# Patient Record
Sex: MALE | Race: WHITE | NOT HISPANIC OR LATINO | Employment: OTHER
[De-identification: names, ages, dates, MRNs, and addresses within clinical notes are randomized per-mention and may not be internally consistent; named-entity substitution may affect disease eponyms.]

---

## 2023-07-18 ENCOUNTER — TRANSCRIBE ORDERS (OUTPATIENT)
Dept: OTHER | Age: 88
End: 2023-07-18

## 2023-07-18 DIAGNOSIS — D50.9 IRON DEFICIENCY ANEMIA, UNSPECIFIED: Primary | ICD-10-CM

## 2023-07-19 NOTE — TELEPHONE ENCOUNTER
REFERRAL INFORMATION:  Referring Provider:  Faye Vann  Referring Clinic:  [facility]  Reason for Visit/Diagnosis: Iron deficiency anemia, unspecified     FUTURE VISIT INFORMATION:  Appointment Date: 8/4/2023  Appointment Time:      NOTES STATUS DETAILS   OFFICE NOTE from Referring Provider N/A    OFFICE NOTE from Other Specialist N/A 6/26/2023 URO visit with AMARIS Panda   HOSPITAL DISCHARGE SUMMARY/  ED VISITS N/A    OPERATIVE REPORT N/A    MEDICATION LIST N/A         ENDOSCOPY  N/A    COLONOSCOPY N/A 1/17/2014 [facility]    ERCP N/A    EUS N/A    STOOL TESTING N/A    PERTINENT LABS N/A    PATHOLOGY REPORTS (RELATED) N/A    IMAGING (CT, MRI, EGD, MRCP, Small Bowel Follow Through/SBT, MR/CT Enterography) N/A      Request sent to [facility] for Records fax [phone]  Records received and send to Amaris FONSECA

## 2023-08-04 ENCOUNTER — OFFICE VISIT (OUTPATIENT)
Dept: GASTROENTEROLOGY | Facility: CLINIC | Age: 88
End: 2023-08-04
Payer: COMMERCIAL

## 2023-08-04 ENCOUNTER — PRE VISIT (OUTPATIENT)
Dept: GASTROENTEROLOGY | Facility: CLINIC | Age: 88
End: 2023-08-04

## 2023-08-04 VITALS — TEMPERATURE: 97.6 F | DIASTOLIC BLOOD PRESSURE: 80 MMHG | SYSTOLIC BLOOD PRESSURE: 138 MMHG | WEIGHT: 157.38 LBS

## 2023-08-04 DIAGNOSIS — D50.0 IRON DEFICIENCY ANEMIA DUE TO CHRONIC BLOOD LOSS: Primary | ICD-10-CM

## 2023-08-04 DIAGNOSIS — R13.19 ESOPHAGEAL DYSPHAGIA: ICD-10-CM

## 2023-08-04 PROCEDURE — 99204 OFFICE O/P NEW MOD 45 MIN: CPT | Performed by: NURSE PRACTITIONER

## 2023-08-04 RX ORDER — AMLODIPINE BESYLATE 2.5 MG/1
2.5 TABLET ORAL
COMMUNITY
Start: 2023-01-09

## 2023-08-04 RX ORDER — OXYBUTYNIN CHLORIDE 5 MG/1
40 TABLET, EXTENDED RELEASE ORAL DAILY
COMMUNITY

## 2023-08-04 RX ORDER — ALLOPURINOL 100 MG/1
200 TABLET ORAL
COMMUNITY

## 2023-08-04 RX ORDER — ACETAMINOPHEN 500 MG
1000 TABLET ORAL
COMMUNITY

## 2023-08-04 RX ORDER — LOSARTAN POTASSIUM 50 MG/1
50 TABLET ORAL EVERY MORNING
COMMUNITY

## 2023-08-04 RX ORDER — IBUPROFEN 400 MG/1
400 TABLET, FILM COATED ORAL
COMMUNITY

## 2023-08-04 RX ORDER — OFLOXACIN 3 MG/ML
SOLUTION/ DROPS OPHTHALMIC
COMMUNITY
Start: 2022-12-28

## 2023-08-04 RX ORDER — PREDNISOLONE ACETATE 10 MG/ML
SUSPENSION/ DROPS OPHTHALMIC
COMMUNITY
Start: 2022-12-28

## 2023-08-04 ASSESSMENT — PAIN SCALES - GENERAL: PAINLEVEL: NO PAIN (0)

## 2023-08-04 NOTE — PROGRESS NOTES
Gastroenterology CLINIC VISIT, NEW PATIENT    CC/REFERRING PROVIDER: Faye Vann  REASON FOR CONSULTATION: iron deficiency anemia    HPI: 88 year old male was referred  to GI clinic for consult on iron deficiency anemia from questionable chronic GI blood loss. Last documented hemoglobin was 13.5. [facility], but the patient stated that he was evaluated for possible malignancy due to changes in his blood tests. Noted that he underwent PET CT which was negative for malignancy. There was an accidental findings of severe hydronephrosis from obstructive renal calculi, which was addressed by urology.   Patient is here today with his wife. Stated that overall, he is feeling well. Has good appetite. No recent weight loss, but  said that he is down from 180s 2 years ago to 150s. Denies abdominal pain, nausea, and vomiting. Admits to chronic problem with swallowing for 2-3 years (some dysphagia, but no chocking). No black stools.    PREVIOUS ENDOSCOPY:  2014 Colonoscopy:  Sigmoid diverticulosis.  No further screening recommended due to patient's age.    PERTINENT STUDIES Reviewed in EMR  5/24/23 PET CT  FINDINGS:   NECK: There is no abnormal metabolic activity within the neck.  There are no enlarged   cervical lymph nodes.   CHEST:   There is no abnormal metabolic activity within the chest.  There are no   enlarged supraclavicular, axillary or mediastinal lymph nodes.   There are calcified pulmonary nodules and calcified right hilar lymph nodes   which are consistent with old granulomatous disease.  There is atherosclerotic   calcification of the left anterior descending coronary artery.   ABDOMEN/PELVIS:   There is mild metabolic uptake within the ascending colon and stomach, presumed   to be physiologic.  No obvious asymmetric wall thickening is seen within the   stomach and bowel on this noncontrast CT.   There has been interval development of severe right  hydronephrosis and   hydroureter secondary to 1 x 0.6 x 1.4 cm stone within the distal right ureter   approximately 2 cm proximal to the ureterovesicular junction.   Is another nonobstructing punctate stone within the right kidney and 2 mm   nonobstructing stone within the left kidney.  There is a stable peripherally   calcified lesion, presumable cyst along the inferior pole of left kidney   medially.  There are others stable renal cysts.   There is cholelithiasis with a 3 cm calcified gallstone.  There is sigmoid and   descending colon diverticulosis without findings of acute diverticulitis.   There is mild atherosclerotic calcification of the abdominal aorta which is   nonaneurysmal.  There is a small fat containing left inguinal hernia.   MUSCULOSKELETAL:  There is no abnormal metabolic activity within the osseous structures.  There   are no suspicious osteolytic or osteoblastic lesions.   The left iliac bone is expanded with trabecular thickening consistent with   underlying Paget's disease.  There are multilevel degenerative changes of the   spine.  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     IMPRESSION:   1. No definite PET/CT findings to suggest primary or metastatic disease within   the neck, chest, abdomen or pelvis.   2. There is a 1 x 0.6 x 1.4 cm obstructing stone within the distal right ureter   approximately 2 cm proximal to the ureterovesicular junction causing severe   right hydronephrosis and hydroureter.  Urology consultation is recommended.   3. Sigmoid and descending colon diverticulosis.   4. Cholelithiasis.   5. Old granulomatous disease.   6. Mild atherosclerotic calcification including calcification of the left   anterior descending coronary artery.   7. 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   8. Other nonacute findings as described.       ROS: 10pt ROS performed and otherwise negative.    PAST MEDICAL HISTORY:  Basal carcinoma of  skin  Bladder outlet obstruction  Cataract    PREVIOUS ABDOMINAL/GYNECOLOGIC SURGERIES:  Abdominal wall hernia    Past Surgical History:   Procedure Laterality Date    IR CYSTOGRAM  2015         PERTINENT MEDICATIONS:  Current Outpatient Medications   Medication Sig Dispense Refill    acetaminophen (TYLENOL) 500 MG tablet Take 1,000 mg by mouth      allopurinol (ZYLOPRIM) 100 MG tablet Take 200 mg by mouth      amLODIPine (NORVASC) 2.5 MG tablet Take 2.5 mg by mouth      ibuprofen (ADVIL/MOTRIN) 400 MG tablet Take 400 mg by mouth      losartan (COZAAR) 50 MG tablet Take 50 mg by mouth every morning      ofloxacin (OCUFLOX) 0.3 % ophthalmic solution Instill 1 drop into right eye TID 2 days before surgery. AFTER surgery TID x 1 week, then stop.      oxybutynin ER (DITROPAN XL) 5 MG 24 hr tablet Take 40 mg by mouth daily      prednisoLONE acetate (PRED FORTE) 1 % ophthalmic suspension AFTER surgery instill 1 drop into right eye TID x 1 week, BID x 1 week, and then DAILY x 1 week.      Sodium Hyaluronate, oral, (HYALURONIC ACID PO) Take 100 mg by mouth         No other OTC/herbal/supplements reported by patient.    SOCIAL HISTORY:  Social History     Socioeconomic History    Marital status:      Spouse name: Not on file    Number of children: Not on file    Years of education: Not on file    Highest education level: Not on file   Occupational History    Not on file   Tobacco Use    Smoking status: Former     Types: Cigarettes     Quit date: 1965     Years since quittin.6    Smokeless tobacco: Never   Substance and Sexual Activity    Alcohol use: Yes    Drug use: Not on file    Sexual activity: Not on file   Other Topics Concern    Not on file   Social History Narrative    Not on file     Social Determinants of Health     Financial Resource Strain: Not on file   Food Insecurity: Not on file   Transportation Needs: Not on file   Physical Activity: Not on file   Stress: Not on file   Social Connections: Not  on file   Intimate Partner Violence: Not on file   Housing Stability: Not on file       FAMILY HISTORY:  Denies panc/esophageal/other GI CA, no other Quiroga or other HPS-related Natasha. No IBD/celiac, no other AI/liver/thyroid disease.  Colon cancer in mother ( at age 66)      PHYSICAL EXAMINATION:  Vitals reviewed  /80 (BP Location: Right arm, Patient Position: Sitting, Cuff Size: Adult Regular)   Temp 97.6  F (36.4  C) (Temporal)   Wt 71.4 kg (157 lb 6 oz)     General: Patient appears well in no acute distress.   Skin: No visualized rash or lesions on visualized skin  Eyes: EOMI, no erythema, sclera icterus or discharge noted  Resp: breathing comfortably without accessory muscle usage, speaking in full sentences, no cough. Lung sounds clear  Card: Regular and rhythmic S1 and S2. No gallop or rub. No murmur. No LE edema.  Abdomen: Active bowel sounds X 4 quadrants. Soft to palpation.  No guarding or rebound tenderness   MSK: Appears to have normal range of motion based on visualized movements  Neurologic: No apparent tremors, facial movements symmetric  Psych: affect normal, alert and oriented      ASSESSMENT/PLAN:  88 year old male  presented  to GI clinic for evaluation of possible chronic GI bleeding as a cause of iron deficiency anemia. [facility] by [facility]ology/hematology. Unfortunately, there was no supportive health records were shared by the [facility] prior to the visit. Will ask our care  to obtain.  Noted that PET CT was negative for mets or malignancy.  Patient appear to be in no acute distress. Last Hgb was 13.5. Noted thrombocytosis and leukocytosis.   Will proceed with a priority upper and lower GI endoscopy.  Patient stated that he has a follow up appointment with his PCP and lab work in 4 days. I suggested to have B12, folate, and peripheral blood smear added , if not done earlier.  Educated on prevention of aspiration and chocking. Emphasized importance of  proper hydration and nutrition.  Patient's and his wife's questions were answered to their satisfaction.      ICD-10-CM    1. Iron deficiency anemia due to chronic blood loss  D50.0 Adult GI  Referral - Consult Only     Adult GI  Referral - Procedure Only      2. Esophageal dysphagia  R13.19 Adult GI  Referral - Procedure Only           Patient and his wife verbalized understanding and appreciation of care provided. Stated that all of the questions were answered to her/his satisfaction.    RTC in one month    Thank you for this consultation. It was a pleasure to participate in the care of this patient; please contact us with any further questions.    ONEYDA Woo, FNP-C  [facility]  Gastroenterology Department  [place], [place]    This note was created with Dragon voice recognition software, and while reviewed for accuracy, inadvertent minor typographic errors may occur. Please contact the provider if you have any questions.

## 2023-08-04 NOTE — PATIENT INSTRUCTIONS
"It was a pleasure taking care of you today.  I've included a brief summary of our discussion and care plan from today's visit below.  Please review this information with your primary care provider.  ______________________________________________________________________    My recommendations are summarized as follows:    As we discussed today, I ordered upper and lower endoscopy to identify possible source of blood loss. They will call you to schedule an appointment. Please plan to have a reliable  that day.    2. Maintain proper hydration and nutrition. Eat small portion meals frequently.    3. Keep an appointment with your primary care provider and have your blood tests done to monitor you for anemia. I would suggest to add vitamin B12 and folate along with peripheral blood smear to the tests.    Return to GI Clinic in one month to review your progress.    ______________________________________________________________________    What is a GI bleed?  \"GI\" stands for \"gastrointestinal.\" The GI system, or GI tract, includes all of the organs in the body that process food . This includes the:  ?Esophagus (the tube that connects the mouth to the stomach)  ?Stomach  ?Small intestine (small bowel)  ?Large intestine (colon or large bowel)  A GI bleed is when any of these organs start to bleed. Often, you do not know that you are bleeding, because it's happening inside your body. But sometimes, there are signs that it is happening.  There are 2 common types of GI bleeds:  ?\"Upper GI bleeds\" - These affect the esophagus, the stomach, and the first part of the small intestine.  ?\"Lower GI bleeds\" - These affect the large intestine (colon).  Bleeding can also happen in the middle of the small intestine, but this is much less common. This is sometimes called \"mid-GI bleeding.\"  What are the symptoms of a GI bleed?  The symptoms depend on whether you have an upper or lower GI bleed. Some people have no symptoms. They find " "out that they have bleeding when a doctor or nurse does a rectal exam on them or a blood test shows that they have something called \"anemia.\" (Anemia is when a person has too few red blood cells.)  The symptoms of an upper GI bleed can include:  ?Vomiting blood or something that looks like coffee grounds  ?Diarrhea or bowel movements that look like black tar - This can happen with lower GI bleeds, too, but it is less common.  The symptoms of a lower GI bleed can include:  ?Bowel movements that look bloody - This can happen with upper GI bleeds, too, but it is less common.  Symptoms that can happen with either an upper or lower GI bleed include:  ?Feeling weak, lightheaded, or woozy (especially if you lose a lot of blood)  ?Racing heartbeat (if you lose a lot of blood)  ?Cramps or belly pain  ?Diarrhea  ?Pale skin  Should I see a doctor or nurse?  See your doctor or nurse right away if you:  ?Vomit blood or something that looks like coffee grounds  ?Have a bowel movement that looks like tar or has blood in it  ?Feel weak, lightheaded, or woozy  ?Have a racing heartbeat  ?Have severe belly pain  ?Turn much paler than normal  What can cause a GI bleed?  The most common causes of GI bleeds include:  ?Ulcers in the stomach or small intestine - Ulcers are sores on the lining of the GI tract.  ?Problems with the blood vessels, for example:  Swollen veins in the esophagus called \"varices\"  Abnormal blood vessels called \"arteriovenous malformations\" (\"AVMs\")  Fragile, swollen blood vessels called \"gastric antral vascular ectasia\" (\"GAVE\")  ?Diverticulosis - This is a condition in which tiny pouches form in the lining of the intestine.  ?Crohn disease or ulcerative colitis - These are conditions that can cause sores to form in the lining of the gut.  ?Hemorrhoids - These are swollen veins in the rectum (the lower part of the colon).  ?Anal fissures - These are tears around the anus.  ?Polyps - These are small growths that can " "form inside the colon.  ?Cancer (this is rare)  Is there a test for a GI bleed?  Yes. If your doctor or nurse suspects that you have a GI bleed, they will order 1 or more tests. Tests include:  ?Blood tests to check if:  You have enough red blood cells (the cells that carry oxygen)  Your blood is clotting normally  Your liver is working normally  ?Upper endoscopy - For this test, you will get medicine to make you sleepy and relaxed. Then, a doctor puts a thin tube called an \"endoscope\" in your mouth and down your throat. The tube has a light on the end and a camera that sends images of your GI tract to a TV screen. If the doctor sees any spots that are bleeding, they can use tools that go through the endoscope to help stop the bleeding.  ?Colonoscopy - This test is similar to an upper endoscopy, but lets the doctor look inside the colon. The tube goes in through the anus (figure 2).  ?Imaging tests that involve putting a dye or weakly radioactive chemical into the blood - These allow doctors to trace where the blood goes.  ?Capsule endoscopy - This test uses a small camera about the size of a vitamin pill. You swallow the camera, and it sends pictures to a recording device that you wear on a belt for 8 hours. A doctor then looks at the pictures. This test lets doctors look at the small intestine, which is hard to see with upper endoscopy or colonoscopy because it is very long. After the test, the camera passes with a bowel movement. Most people do not notice when it comes out.  How is a GI bleed treated?  Treatment depends on how much blood you have lost and what seems to be causing your bleeding. You might get 1 or more of these treatments:  ?Oxygen - This can be given through a mask or a tube that sits under your nose.  ?Blood or fluids given by IV - An IV is a thin tube that goes into a vein. This might be done to replace blood that you lost or treat a bleeding disorder.  ?Medicines to reduce stomach acid or " "treat a bleeding disorder  ?Medicines to help clean out and empty your GI system - This lets the doctor see more clearly what is happening inside.  ?Antibiotics  ?A small tube that goes up your nose and down your throat - This is a way for the doctor to rinse out your stomach.  Depending on where the bleeding seems to be, you might also have an upper endoscopy, a colonoscopy, or both. This can help the doctors find where the bleeding is coming from. Doctors can sometimes use the endoscope or colonoscope to seal off blood vessels and stop them from bleeding.  After the bleeding has stopped, your doctor or nurse will probably want to learn why you started bleeding in the first place. If you have ulcers or another condition that could lead to bleeding, they will want to make sure that those problems are treated.  Can a GI bleed be prevented?  To help lower your chances of getting a GI bleed:  ?Do not take medicines called \"NSAIDs\" too often, unless your doctor tells you that it is OK - These medicines can cause ulcers. Examples of NSAIDs include aspirin, ibuprofen (sample brand names: Advil, Motrin), and naproxen (sample brand names: Aleve, Naprosyn). If you have to take these medicines regularly, your doctor might give you another medicine to decrease your risk of bleeding.  ?Get treated for stomach ulcers, if you have them.  ?Get treatment for esophageal varices, if you have them - These are swollen blood vessels in the esophagus. They are common in people with a liver disease called \"cirrhosis.\" Getting treatment with medicines or a procedure can lower the risk of bleeding.   ______________________________________________________________________    Who do I call with any questions after my visit?  Please be in touch if there are any further questions that arise following today's visit.  There are multiple ways to contact your gastroenterology care team.      During business hours, you may reach a Gastroenterology " nurse at [phone], option 3.     To schedule or reschedule an appointment, please call [phone].   To schedule your imaging studies (CT, MRI, ultrasound)  call [phone] (or toll-free # [phone])  To schedule your lab work at [facility], please call [phone]    You can always send a secure message through [employer].  [employer] messages are answered by your nurse or doctor typically within 24 hours.  Please allow extra time on weekends and holidays.      For urgent/emergent questions after business hours, you may reach the on-call GI Fellow by contacting the [facility]  at [phone].    In order for your refill to be processed in a timely fashion, it is your responsibility to ensure you follow the recommendations from your provider regarding your laboratory studies and follow up appointments.       How will I get the results of any tests ordered?    You will receive all of your results.  If you have signed up for [employer], any tests ordered at your visit will be available to you after your physician reviews them.  Typically this takes 1-2 weeks.  If there are urgent results that require a change in your care plan, your physician or nurse will call you to discuss the next steps.   What is [employer]?  [employer] is a secure way for you to access all of your healthcare records from the [facility].  It is a web based computer program, so you can sign on to it from any location.  It also allows you to send secure messages to your care team.  I recommend signing up for [employer] access if you have not already done so and are comfortable with using a computer.    How to I schedule a follow-up visit?  If you did not schedule a follow-up visit today, please call [phone] to schedule a follow-up office visit.      Sincerely,  KALYN Woo,  [facility],  Division of Gastroenterology   ([facility])

## 2023-08-04 NOTE — LETTER
8/4/2023         RE: Jamie Stone Jr.  [address] [place] [address]        Dear Colleague,    Thank you for referring your patient, Jamie Stone Jr., to the [facility]. Please see a copy of my visit note below.    Gastroenterology CLINIC VISIT, NEW PATIENT    CC/REFERRING PROVIDER: Faye Vann  REASON FOR CONSULTATION: iron deficiency anemia    HPI: 88 year old male was referred  to GI clinic for consult on iron deficiency anemia from questionable chronic GI blood loss. Last documented hemoglobin was 13.5. [facility] not [facility], but the patient stated that he was evaluated for possible malignancy due to changes in his blood tests. Noted that he underwent PET CT which was negative for malignancy. There was an accidental findings of severe hydronephrosis from obstructive renal calculi, which was addressed by urology.   Patient is here today with his wife. Stated that overall, he is feeling well. Has good appetite. No recent weight loss, but  said that he is down from 180s 2 years ago to 150s. Denies abdominal pain, nausea, and vomiting. Admits to chronic problem with swallowing for 2-3 years (some dysphagia, but no chocking). No black stools.    PREVIOUS ENDOSCOPY:  2014 Colonoscopy:  Sigmoid diverticulosis.  No further screening recommended due to patient's age.    PERTINENT STUDIES Reviewed in EMR  5/24/23 PET CT  FINDINGS:   NECK: There is no abnormal metabolic activity within the neck.  There are no enlarged   cervical lymph nodes.   CHEST:   There is no abnormal metabolic activity within the chest.  There are no   enlarged supraclavicular, axillary or mediastinal lymph nodes.   There are calcified pulmonary nodules and calcified right hilar lymph nodes   which are consistent with old granulomatous disease.  There is atherosclerotic   calcification of the left anterior descending coronary artery.   ABDOMEN/PELVIS:   There  is mild metabolic uptake within the ascending colon and stomach, presumed   to be physiologic.  No obvious asymmetric wall thickening is seen within the   stomach and bowel on this noncontrast CT.   There has been interval development of severe right hydronephrosis and   hydroureter secondary to 1 x 0.6 x 1.4 cm stone within the distal right ureter   approximately 2 cm proximal to the ureterovesicular junction.   Is another nonobstructing punctate stone within the right kidney and 2 mm   nonobstructing stone within the left kidney.  There is a stable peripherally   calcified lesion, presumable cyst along the inferior pole of left kidney   medially.  There are others stable renal cysts.   There is cholelithiasis with a 3 cm calcified gallstone.  There is sigmoid and   descending colon diverticulosis without findings of acute diverticulitis.   There is mild atherosclerotic calcification of the abdominal aorta which is   nonaneurysmal.  There is a small fat containing left inguinal hernia.   MUSCULOSKELETAL:  There is no abnormal metabolic activity within the osseous structures.  There   are no suspicious osteolytic or osteoblastic lesions.   The left iliac bone is expanded with trabecular thickening consistent with   underlying Paget's disease.  There are multilevel degenerative changes of the   spine.  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     IMPRESSION:   1. No definite PET/CT findings to suggest primary or metastatic disease within   the neck, chest, abdomen or pelvis.   2. There is a 1 x 0.6 x 1.4 cm obstructing stone within the distal right ureter   approximately 2 cm proximal to the ureterovesicular junction causing severe   right hydronephrosis and hydroureter.  Urology consultation is recommended.   3. Sigmoid and descending colon diverticulosis.   4. Cholelithiasis.   5. Old granulomatous disease.   6. Mild atherosclerotic calcification including calcification of the left   anterior  descending coronary artery.   7. 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   8. Other nonacute findings as described.       ROS: 10pt ROS performed and otherwise negative.    PAST MEDICAL HISTORY:  Basal carcinoma of skin  Bladder outlet obstruction  Cataract    PREVIOUS ABDOMINAL/GYNECOLOGIC SURGERIES:  Abdominal wall hernia    Past Surgical History:   Procedure Laterality Date     IR CYSTOGRAM  2015         PERTINENT MEDICATIONS:  Current Outpatient Medications   Medication Sig Dispense Refill     acetaminophen (TYLENOL) 500 MG tablet Take 1,000 mg by mouth       allopurinol (ZYLOPRIM) 100 MG tablet Take 200 mg by mouth       amLODIPine (NORVASC) 2.5 MG tablet Take 2.5 mg by mouth       ibuprofen (ADVIL/MOTRIN) 400 MG tablet Take 400 mg by mouth       losartan (COZAAR) 50 MG tablet Take 50 mg by mouth every morning       ofloxacin (OCUFLOX) 0.3 % ophthalmic solution Instill 1 drop into right eye TID 2 days before surgery. AFTER surgery TID x 1 week, then stop.       oxybutynin ER (DITROPAN XL) 5 MG 24 hr tablet Take 40 mg by mouth daily       prednisoLONE acetate (PRED FORTE) 1 % ophthalmic suspension AFTER surgery instill 1 drop into right eye TID x 1 week, BID x 1 week, and then DAILY x 1 week.       Sodium Hyaluronate, oral, (HYALURONIC ACID PO) Take 100 mg by mouth         No other OTC/herbal/supplements reported by patient.    SOCIAL HISTORY:  Social History     Socioeconomic History     Marital status:      Spouse name: Not on file     Number of children: Not on file     Years of education: Not on file     Highest education level: Not on file   Occupational History     Not on file   Tobacco Use     Smoking status: Former     Types: Cigarettes     Quit date: 1965     Years since quittin.6     Smokeless tobacco: Never   Substance and Sexual Activity     Alcohol use: Yes     Drug use: Not on file     Sexual activity: Not on file   Other Topics Concern     Not on  file   Social History Narrative     Not on file     Social Determinants of Health     Financial Resource Strain: Not on file   Food Insecurity: Not on file   Transportation Needs: Not on file   Physical Activity: Not on file   Stress: Not on file   Social Connections: Not on file   Intimate Partner Violence: Not on file   Housing Stability: Not on file       FAMILY HISTORY:  Denies panc/esophageal/other GI CA, no other Quiroga or other HPS-related Natasha. No IBD/celiac, no other AI/liver/thyroid disease.  Colon cancer in mother ( at age 66)      PHYSICAL EXAMINATION:  Vitals reviewed  /80 (BP Location: Right arm, Patient Position: Sitting, Cuff Size: Adult Regular)   Temp 97.6  F (36.4  C) (Temporal)   Wt 71.4 kg (157 lb 6 oz)     General: Patient appears well in no acute distress.   Skin: No visualized rash or lesions on visualized skin  Eyes: EOMI, no erythema, sclera icterus or discharge noted  Resp: breathing comfortably without accessory muscle usage, speaking in full sentences, no cough. Lung sounds clear  Card: Regular and rhythmic S1 and S2. No gallop or rub. No murmur. No LE edema.  Abdomen: Active bowel sounds X 4 quadrants. Soft to palpation.  No guarding or rebound tenderness   MSK: Appears to have normal range of motion based on visualized movements  Neurologic: No apparent tremors, facial movements symmetric  Psych: affect normal, alert and oriented      ASSESSMENT/PLAN:  88 year old male  presented  to GI clinic for evaluation of possible chronic GI bleeding as a cause of iron deficiency anemia. [facility] by [facility]ology/hematology. Unfortunately, there was no supportive health records were shared by the [facility] prior to the visit. Will ask our care  to obtain.  Noted that PET CT was negative for mets or malignancy.  Patient appear to be in no acute distress. Last Hgb was 13.5. Noted thrombocytosis and leukocytosis.   Will proceed with a priority upper and  lower GI endoscopy.  Patient stated that he has a follow up appointment with his PCP and lab work in 4 days. I suggested to have B12, folate, and peripheral blood smear added , if not done earlier.  Educated on prevention of aspiration and chocking. Emphasized importance of proper hydration and nutrition.  Patient's and his wife's questions were answered to their satisfaction.      ICD-10-CM    1. Iron deficiency anemia due to chronic blood loss  D50.0 Adult GI  Referral - Consult Only     Adult GI  Referral - Procedure Only      2. Esophageal dysphagia  R13.19 Adult GI  Referral - Procedure Only           Patient and his wife verbalized understanding and appreciation of care provided. Stated that all of the questions were answered to her/his satisfaction.    RTC in one month    Thank you for this consultation. It was a pleasure to participate in the care of this patient; please contact us with any further questions.    ONEYDA Woo, FNP-C  [facility]  Gastroenterology Department  [place], [place]    This note was created with Dragon voice recognition software, and while reviewed for accuracy, inadvertent minor typographic errors may occur. Please contact the provider if you have any questions.       Again, thank you for allowing me to participate in the care of your patient.        Sincerely,        ONEYDA WOO CNP

## 2023-08-06 ENCOUNTER — CARE COORDINATION (OUTPATIENT)
Dept: GASTROENTEROLOGY | Facility: CLINIC | Age: 88
End: 2023-08-06
Payer: COMMERCIAL

## 2023-08-07 ENCOUNTER — TELEPHONE (OUTPATIENT)
Dept: GASTROENTEROLOGY | Facility: CLINIC | Age: 88
End: 2023-08-07
Payer: COMMERCIAL

## 2023-08-07 NOTE — TELEPHONE ENCOUNTER
"Endoscopy Scheduling Screen    Have you had a positive Covid test in the last 14 days?  No    Are you active on MyChart?   No    What insurance is in the chart?  Other:  [facility]    Ordering/Referring Provider: VICKY COHN    (If ordering provider performs procedure, schedule with ordering provider unless otherwise instructed. )    BMI: There is no height or weight on file to calculate BMI.   23.45    Sedation Ordered  MAC/deep sedation.   BMI<= 45 45 < BMI <= 48 48 < BMI < = 50  BMI > 50   No Restrictions No [facility]  No [facility]  [facility] with exceptions Hospital Only OR Only       Do you have a history of malignant hyperthermia or adverse reaction to anesthesia?  No    (Females) Are you currently pregnant?   No     Have you been diagnosed or told you have pulmonary hypertension?   No    Do you have an LVAD?  No    Have you been told you have moderate to severe sleep apnea?  No    Have you been told you have COPD, asthma, or any other lung disease?  No    Do you have any heart conditions?  No     Have you ever had or are you awaiting a heart or lung transplant?   No    Have you had a stroke or transient ischemic attack (TIA aka \"mini stroke\" in the last 6 months?   No    Have you been diagnosed with or been told you have cirrhosis of the liver?   No    Are you currently on dialysis?   No    Do you need assistance transferring?   No    BMI: There is no height or weight on file to calculate BMI.     Is patients BMI > 40 and scheduling location [facility]?  No    Do you take the medication Phentermine, Ozempic or Wegovy?  No    Do you take the medication Naltrexone?  No    Do you take blood thinners?  Yes     Are you taking Effient/Prasugrel?  No, you must contact your prescribing provider for direction on holding or bridging with a different medication.      Prep   Are you currently on dialysis or do you have chronic kidney disease?  No    Do you have a diagnosis of diabetes?  No    Do you have a diagnosis of cystic " fibrosis (CF)?  No    On a regular basis do you go 3 -5 days between bowel movements?  No    BMI > 40?  No    Preferred Pharmacy:    [facility] - [place], [place] - [address]  [address] [facility] [address]  Phone: [phone] Fax: [phone]      Final Scheduling Details   Colonoscopy prep sent?  MiraLAX (No Mag Citrate)    Procedure scheduled  Colonoscopy / Upper endoscopy (EGD)    Surgeon:  ANNIE     Date of procedure:  9/27/2023     Schedule PAC:   No    Location  PH    Sedation   MAC/Deep Sedation    Patient Reminders:   You will receive a call from a Nurse to review instructions and health history.  This assessment must be completed prior to your procedure.  Failure to complete the Nurse assessment may result in the procedure being cancelled.      On the day of your procedure, please designate an adult(s) who can drive you home stay with you for the next 24 hours. The medicines used in the exam will make you sleepy. You will not be able to drive.      You cannot take public transportation, ride share services, or non-medical taxi service without a responsible caregiver.  Medical transport services are allowed with the requirement that a responsible caregiver will receive you at your destination.  We require that drivers and caregivers are confirmed prior to your procedure.

## 2023-08-11 ENCOUNTER — TRANSFERRED RECORDS (OUTPATIENT)
Dept: HEALTH INFORMATION MANAGEMENT | Facility: CLINIC | Age: 88
End: 2023-08-11
Payer: COMMERCIAL

## 2023-09-26 NOTE — H&P
[facility] Anesthesia Pre-op History and Physical    Jamie Stone Jr. MRN# [id number]   Age: 88 year old YOB: 1935      Date of Surgery: 9/27/2023 Location [facility]      Date of Exam 9/27/2023 Facility (In hospital)       Home clinic: [facility]  Primary care provider: Faye Vann         Chief Complaint and/or Reason for Procedure:   No chief complaint on file.  EGD. dysphagia  Colon. Anemia. Exam 2014. Hgb 13.5       Active problem list:     There are no problems to display for this patient.           Medications (include herbals and vitamins):   Any Plavix use in the last 7 days? No     No current facility-administered medications for this encounter.     Current Outpatient Medications   Medication Sig    acetaminophen (TYLENOL) 500 MG tablet Take 1,000 mg by mouth    allopurinol (ZYLOPRIM) 100 MG tablet Take 200 mg by mouth    amLODIPine (NORVASC) 2.5 MG tablet Take 2.5 mg by mouth    ibuprofen (ADVIL/MOTRIN) 400 MG tablet Take 400 mg by mouth    losartan (COZAAR) 50 MG tablet Take 50 mg by mouth every morning    ofloxacin (OCUFLOX) 0.3 % ophthalmic solution Instill 1 drop into right eye TID 2 days before surgery. AFTER surgery TID x 1 week, then stop.    oxybutynin ER (DITROPAN XL) 5 MG 24 hr tablet Take 40 mg by mouth daily    prednisoLONE acetate (PRED FORTE) 1 % ophthalmic suspension AFTER surgery instill 1 drop into right eye TID x 1 week, BID x 1 week, and then DAILY x 1 week.    Sodium Hyaluronate, oral, (HYALURONIC ACID PO) Take 100 mg by mouth             Allergies:      Allergies   Allergen Reactions    Lisinopril Cough     Allergy to Latex? No  Allergy to tape?   No  Intolerances:             Physical Exam:   All vitals have been reviewed  No data found.  No intake/output data recorded.  Lungs:   No increased work of breathing, good air exchange, clear to auscultation bilaterally, no crackles or wheezing      Cardiovascular:   Normal apical impulse, regular rate and rhythm, normal S1 and S2, no S3 or S4, and no murmur noted             Lab / Radiology Results:            Anesthetic risk and/or ASA classification:       Drake Wharton MD

## 2023-09-27 ENCOUNTER — ANESTHESIA EVENT (OUTPATIENT)
Dept: GASTROENTEROLOGY | Facility: CLINIC | Age: 88
End: 2023-09-27
Payer: COMMERCIAL

## 2023-09-27 ENCOUNTER — ANESTHESIA (OUTPATIENT)
Dept: GASTROENTEROLOGY | Facility: CLINIC | Age: 88
End: 2023-09-27
Payer: COMMERCIAL

## 2023-09-27 ENCOUNTER — HOSPITAL ENCOUNTER (OUTPATIENT)
Facility: CLINIC | Age: 88
Discharge: HOME | End: 2023-09-27
Attending: INTERNAL MEDICINE | Admitting: INTERNAL MEDICINE
Payer: COMMERCIAL

## 2023-09-27 VITALS
WEIGHT: 157 LBS | HEART RATE: 64 BPM | RESPIRATION RATE: 18 BRPM | OXYGEN SATURATION: 99 % | DIASTOLIC BLOOD PRESSURE: 81 MMHG | BODY MASS INDEX: 26.16 KG/M2 | TEMPERATURE: 97.9 F | SYSTOLIC BLOOD PRESSURE: 155 MMHG | HEIGHT: 65 IN

## 2023-09-27 LAB
COLONOSCOPY: NORMAL
UPPER GI ENDOSCOPY: NORMAL

## 2023-09-27 PROCEDURE — 43239 EGD BIOPSY SINGLE/MULTIPLE: CPT | Performed by: INTERNAL MEDICINE

## 2023-09-27 PROCEDURE — 250N000011 HC RX IP 250 OP 636: Mod: JZ | Performed by: NURSE ANESTHETIST, CERTIFIED REGISTERED

## 2023-09-27 PROCEDURE — 88305 TISSUE EXAM BY PATHOLOGIST: CPT | Mod: TC | Performed by: INTERNAL MEDICINE

## 2023-09-27 PROCEDURE — 250N000009 HC RX 250: Performed by: NURSE ANESTHETIST, CERTIFIED REGISTERED

## 2023-09-27 PROCEDURE — 88342 IMHCHEM/IMCYTCHM 1ST ANTB: CPT | Mod: 26 | Performed by: PATHOLOGY

## 2023-09-27 PROCEDURE — 45385 COLONOSCOPY W/LESION REMOVAL: CPT | Performed by: INTERNAL MEDICINE

## 2023-09-27 PROCEDURE — 258N000003 HC RX IP 258 OP 636: Performed by: NURSE ANESTHETIST, CERTIFIED REGISTERED

## 2023-09-27 PROCEDURE — 88305 TISSUE EXAM BY PATHOLOGIST: CPT | Mod: 26 | Performed by: PATHOLOGY

## 2023-09-27 PROCEDURE — 370N000017 HC ANESTHESIA TECHNICAL FEE, PER MIN: Performed by: INTERNAL MEDICINE

## 2023-09-27 RX ORDER — LIDOCAINE HYDROCHLORIDE 20 MG/ML
INJECTION, SOLUTION INFILTRATION; PERINEURAL PRN
Status: DISCONTINUED | OUTPATIENT
Start: 2023-09-27 | End: 2023-09-27

## 2023-09-27 RX ORDER — ONDANSETRON 4 MG/1
4 TABLET, ORALLY DISINTEGRATING ORAL EVERY 30 MIN PRN
Status: CANCELLED | OUTPATIENT
Start: 2023-09-27

## 2023-09-27 RX ORDER — PROPOFOL 10 MG/ML
INJECTION, EMULSION INTRAVENOUS PRN
Status: DISCONTINUED | OUTPATIENT
Start: 2023-09-27 | End: 2023-09-27

## 2023-09-27 RX ORDER — PROPOFOL 10 MG/ML
INJECTION, EMULSION INTRAVENOUS CONTINUOUS PRN
Status: DISCONTINUED | OUTPATIENT
Start: 2023-09-27 | End: 2023-09-27

## 2023-09-27 RX ORDER — ONDANSETRON 2 MG/ML
4 INJECTION INTRAMUSCULAR; INTRAVENOUS EVERY 30 MIN PRN
Status: CANCELLED | OUTPATIENT
Start: 2023-09-27

## 2023-09-27 RX ORDER — SODIUM CHLORIDE, SODIUM LACTATE, POTASSIUM CHLORIDE, CALCIUM CHLORIDE 600; 310; 30; 20 MG/100ML; MG/100ML; MG/100ML; MG/100ML
INJECTION, SOLUTION INTRAVENOUS CONTINUOUS
Status: DISCONTINUED | OUTPATIENT
Start: 2023-09-27 | End: 2023-09-27 | Stop reason: HOSPADM

## 2023-09-27 RX ADMIN — LIDOCAINE HYDROCHLORIDE 50 MG: 20 INJECTION, SOLUTION INFILTRATION; PERINEURAL at 13:19

## 2023-09-27 RX ADMIN — PHENYLEPHRINE HYDROCHLORIDE 100 MCG: 10 INJECTION INTRAVENOUS at 13:36

## 2023-09-27 RX ADMIN — SODIUM CHLORIDE, POTASSIUM CHLORIDE, SODIUM LACTATE AND CALCIUM CHLORIDE 10 ML/HR: 600; 310; 30; 20 INJECTION, SOLUTION INTRAVENOUS at 12:42

## 2023-09-27 RX ADMIN — PROPOFOL 20 MG: 10 INJECTION, EMULSION INTRAVENOUS at 13:21

## 2023-09-27 RX ADMIN — PROPOFOL 150 MCG/KG/MIN: 10 INJECTION, EMULSION INTRAVENOUS at 13:19

## 2023-09-27 RX ADMIN — PROPOFOL 50 MG: 10 INJECTION, EMULSION INTRAVENOUS at 13:19

## 2023-09-27 RX ADMIN — PHENYLEPHRINE HYDROCHLORIDE 100 MCG: 10 INJECTION INTRAVENOUS at 13:40

## 2023-09-27 ASSESSMENT — LIFESTYLE VARIABLES: TOBACCO_USE: 1

## 2023-09-27 ASSESSMENT — ACTIVITIES OF DAILY LIVING (ADL)
ADLS_ACUITY_SCORE: 35
ADLS_ACUITY_SCORE: 35

## 2023-09-27 NOTE — DISCHARGE INSTRUCTIONS
[facility]    Home Care Following Endoscopy          Activity:  You have just undergone an endoscopic procedure usually performed with conscious sedation.  Do not work or operate machinery (including a car) for at least 12 hours.    I encourage you to walk and attempt to pass this air as soon as possible.    Diet:  Return to the diet you were on before your procedure but eat lightly for the first 12-24 hours.  Drink plenty of water.  Resume any regular medications unless otherwise advised by your physician.  Please begin any new medication prescribed as a result of your procedure as directed by your physician.   If you had any biopsy or polyp removed please refrain from aspirin or aspirin products for 2 days.  If on Coumadin please restart as instructed by your physician.   Pain:  You may take Tylenol as needed for pain.  Expected Recovery:  You can expect some mild abdominal fullness and/or discomfort due to the air used to inflate your intestinal tract. It is also normal to have a mild sore throat after upper endoscopy.    Call Your Physician if You Have:    After Colonoscopy:  Worsening persisting abdominal pain which is worse with activity.  Fevers (>101 degrees F), chills or shakes.  Passage of continued blood with bowel movements.     Any questions or concerns about your recovery, please call [phone] or after hours [facility] ([phone]) Nurse Advice Line.    Follow-up Care:  You did have polyps/biopsy tissue sample(s) removed.  The polyps/biopsy tissue sample(s) will be sent to pathology.    You should receive letter in your My Chart from Dr. Wharton with your results within 1-2 weeks. If you do not participate in My Chart a physical letter will come in the mail in 2-3 weeks.  Please call if you have not received a notification of your results.  If asked to return to clinic please make an appointment 1 week after your procedure.  Call [phone].

## 2023-09-27 NOTE — LETTER
October 9, 2023      Jamie Stone .  [address] [place] [address]        Dear ,    We are writing to inform you of your test results.    The Hp infection can be discussed with the return visit planned tomorrow.  The rest of the labs are non-concerning.    Resulted Orders   Surgical Pathology Exam   Result Value Ref Range    Case Report       Surgical Pathology Report                         Case: [id number]                                  Authorizing Provider:  Drake Wharton MD        Collected:           09/27/2023 01:23 PM          Ordering Location:     [facility]          Received:            09/27/2023 01:57 PM                                 [facility] End[facility]                                                          Pathologist:           Codie Solis MD PhD                                                      Specimens:   A) - Small Intestine                                                                                B) - Large Intestine, Colon, Sigmoid                                                       Final Diagnosis       A(1).  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  -POSITIVE for H. Pylori organisms on routine stains.  -Negative for dysplasia or malignancy.      -Separate fragment of small intestinal mucosa with no significant histopathologic abnormalities.  -Normal villous architecture identified and no prominence in intraepithelial lymphocytes seen.  -Negative for luminal organisms.  -Negative for dysplasia or malignancy.      B(2).   Colon, Sigmoid, polyp, polypectomy:  -Colonic mucosa with features suggestive of juvenile/inflammatory type polyp.  -Negative for dysplasia or malignancy.        Clinical Information       Procedure:  COLONOSCOPY, FLEXIBLE, WITH LESION REMOVAL USING SNARE  ESOPHAGOGASTRODUODENOSCOPY, WITH BIOPSY  Pre-op Diagnosis: Iron deficiency anemia due to  "chronic blood loss [D50.0]  Esophageal dysphagia [R13.19]  Post-op Diagnosis: D50.0 - Iron deficiency anemia due to chronic blood loss [ICD-10-CM]  R13.19 - Esophageal dysphagia [ICD-10-CM]      Gross Description       A(1). Small Intestine, :  The specimen is received in formalin, labeled with the patient's name, medical record number and other identifying information designated \"small intestine biopsy\". It consists of 5 tan soft tissue fragments, 0.3-0.6 cm.  Entirely submitted in 1 cassette.    B(2). Large Intestine, Colon, Sigmoid, :  The specimen is received in formalin, labeled with the patient's name, medical record number and other identifying information designated \"sigmoid colon polyp\". It consists of a single 0.5 cm tan soft tissue fragment.  Entirely submitted in 1 cassette.  (Dominga Jamil Biopsy Tech)      Microscopic Description       Microscopic examination was performed.        Special Stains       -Positive for H. pylori organisms on H. pylori immunostain.  All controls stain appropriately.        Performing Labs       The technical component of this testing was completed at [facility]      Case Images         If you have any questions or concerns, please call the clinic at the number listed above.       Sincerely,      Drake Wharton MD            "

## 2023-09-27 NOTE — LETTER
[facility] ENDOSCOPY  [address] [address]  Phone: [phone]    December 4, 2023        Jamie Wattuk Jacques  [address] [place] [address]    Dear ,    We are writing to inform you of your test results.    The Hp infection can be discussed with the return visit planned tomorrow.    The rest of the labs are non-concerning.      A repeat future colonoscopy is not needed with the non-concerning polyps.    Congratulations.    Resulted Orders   Surgical Pathology Exam   Result Value Ref Range    Case Report       Surgical Pathology Report                         Case: [id number]                                  Authorizing Provider:  Drake Wharton MD        Collected:           09/27/2023 01:23 PM          Ordering Location:     [facility]          Received:            09/27/2023 01:57 PM                                 [facility]                                                          Pathologist:           Codie Solis MD PhD                                                      Specimens:   A) - Small Intestine                                                                                B) - Large Intestine, Colon, Sigmoid                                                       Final Diagnosis       A(1).  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  -POSITIVE for H. Pylori organisms on routine stains.  -Negative for dysplasia or malignancy.      -Separate fragment of small intestinal mucosa with no significant histopathologic abnormalities.  -Normal villous architecture identified and no prominence in intraepithelial lymphocytes seen.  -Negative for luminal organisms.  -Negative for dysplasia or malignancy.      B(2).   Colon, Sigmoid, polyp, polypectomy:  -Colonic mucosa with features suggestive of juvenile/inflammatory type polyp.  -Negative for dysplasia or  "malignancy.        Clinical Information       Procedure:  COLONOSCOPY, FLEXIBLE, WITH LESION REMOVAL USING SNARE  ESOPHAGOGASTRODUODENOSCOPY, WITH BIOPSY  Pre-op Diagnosis: Iron deficiency anemia due to chronic blood loss [D50.0]  Esophageal dysphagia [R13.19]  Post-op Diagnosis: D50.0 - Iron deficiency anemia due to chronic blood loss [ICD-10-CM]  R13.19 - Esophageal dysphagia [ICD-10-CM]      Gross Description       A(1). Small Intestine, :  The specimen is received in formalin, labeled with the patient's name, medical record number and other identifying information designated \"small intestine biopsy\". It consists of 5 tan soft tissue fragments, 0.3-0.6 cm.  Entirely submitted in 1 cassette.    B(2). Large Intestine, Colon, Sigmoid, :  The specimen is received in formalin, labeled with the patient's name, medical record number and other identifying information designated \"sigmoid colon polyp\". It consists of a single 0.5 cm tan soft tissue fragment.  Entirely submitted in 1 cassette.  (Dominga Jamil Biopsy Tech)      Microscopic Description       Microscopic examination was performed.        Special Stains       -Positive for H. pylori organisms on H. pylori immunostain.  All controls stain appropriately.        Performing Labs       The technical component of this testing was completed at [facility]      Case Images         If you have any questions or concerns, please call the clinic at the number listed above.       Sincerely,      Drake Wharton MD    "

## 2023-09-27 NOTE — ANESTHESIA CARE TRANSFER NOTE
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    Anesthesia Type:   MAC     Note:    Oropharynx: oropharynx clear of all foreign objects and spontaneously breathing  Level of Consciousness: drowsy  Oxygen Supplementation: face mask    Independent Airway: airway patency satisfactory and stable  Dentition: dentition unchanged  Vital Signs Stable: post-procedure vital signs reviewed and stable  Report to RN Given: handoff report given  Patient transferred to: Phase II    Handoff Report: Identifed the Patient, Identified the Reponsible Provider, Reviewed the pertinent medical history, Discussed the surgical course, Reviewed Intra-OP anesthesia mangement and issues during anesthesia, Set expectations for post-procedure period and Allowed opportunity for questions and acknowledgement of understanding      Vitals:  Vitals Value Taken Time   BP     Temp     Pulse     Resp     SpO2         Electronically Signed By: ONEYDA Oliver CRNA  September 27, 2023  1:49 PM

## 2023-09-27 NOTE — ANESTHESIA PREPROCEDURE EVALUATION
Anesthesia Pre-Procedure Evaluation    Patient: Jamie Stone Jr.   MRN: [id number] : 1935        Procedure : Procedure(s):  Colonoscopy  Esophagoscopy, gastroscopy, duodenoscopy (EGD), combined          No past medical history on file.   Past Surgical History:   Procedure Laterality Date    IR CYSTOGRAM  2015      Allergies   Allergen Reactions    Lisinopril Cough      Social History     Tobacco Use    Smoking status: Former     Types: Cigarettes     Quit date: 1965     Years since quittin.7    Smokeless tobacco: Never   Substance Use Topics    Alcohol use: Yes      Wt Readings from Last 1 Encounters:   23 71.4 kg (157 lb 6 oz)        Anesthesia Evaluation   Pt has had prior anesthetic. Type: General and MAC.        ROS/MED HX  ENT/Pulmonary:     (+) sleep apnea,               tobacco use, Past use,                      Neurologic:  - neg neurologic ROS     Cardiovascular:     (+) Dyslipidemia hypertension- -   -  - -                                      METS/Exercise Tolerance:     Hematologic:  - neg hematologic  ROS     Musculoskeletal:  - neg musculoskeletal ROS     GI/Hepatic:     (+)        bowel prep,            Renal/Genitourinary:     (+)       Nephrolithiasis , BPH,      Endo:  - neg endo ROS     Psychiatric/Substance Use:  - neg psychiatric ROS     Infectious Disease:  - neg infectious disease ROS     Malignancy:  - neg malignancy ROS     Other:            Physical Exam    Airway  airway exam normal      Mallampati: II   TM distance: > 3 FB   Neck ROM: full   Mouth opening: > 3 cm    Respiratory Devices and Support         Dental           Cardiovascular   cardiovascular exam normal       Rhythm and rate: regular and normal     Pulmonary   pulmonary exam normal        breath sounds clear to auscultation           OUTSIDE LABS:  CBC: No results found for: WBC, HGB, HCT, PLT  BMP: No results found for: NA, POTASSIUM, CHLORIDE, CO2, BUN, CR, GLC  COAGS: No results found for: PTT,  INR, FIBR  POC: No results found for: BGM, HCG, HCGS  HEPATIC: No results found for: ALBUMIN, PROTTOTAL, ALT, AST, GGT, ALKPHOS, BILITOTAL, BILIDIRECT, JINA  OTHER: No results found for: PH, LACT, A1C, MARIAA, PHOS, MAG, LIPASE, AMYLASE, TSH, T4, T3, CRP, SED    Anesthesia Plan    ASA Status:  2    NPO Status:  NPO Appropriate    Anesthesia Type: MAC.     - Reason for MAC: chronic cardiopulmonary disease   Induction: Intravenous, Propofol.   Maintenance: TIVA.        Consents    Anesthesia Plan(s) and associated risks, benefits, and realistic alternatives discussed. Questions answered and patient/representative(s) expressed understanding.     - Discussed:     - Discussed with:  Patient       Use of blood products discussed: No .     Postoperative Care            Comments:    Other Comments: The risks and benefits of anesthesia, and the alternatives where applicable, have been discussed with the patient, and they wish to proceed.               ONEYDA Oliver CRNA

## 2023-09-27 NOTE — ANESTHESIA POSTPROCEDURE EVALUATION
Patient: Jamie Stone .    Procedure: Procedure(s):  COLONOSCOPY, FLEXIBLE, WITH LESION REMOVAL USING SNARE  ESOPHAGOGASTRODUODENOSCOPY, WITH BIOPSY       Anesthesia Type:  MAC    Note:  Disposition: Outpatient   Postop Pain Control: Uneventful            Sign Out: Well controlled pain   PONV: No   Neuro/Psych: Uneventful            Sign Out: Acceptable/Baseline neuro status   Airway/Respiratory: Uneventful            Sign Out: Acceptable/Baseline resp. status   CV/Hemodynamics: Uneventful            Sign Out: Acceptable CV status   Other NRE: NONE   DID A NON-ROUTINE EVENT OCCUR? No    Event details/Postop Comments:  Pt was happy with anesthesia care.  No complications.  I will follow up with the pt if needed.           Last vitals:  Vitals Value Taken Time   /53 09/27/23 1357   Temp     Pulse 53 09/27/23 1357   Resp     SpO2 98 % 09/27/23 1357       Electronically Signed By: ONEYDA Oliver CRNA  September 27, 2023  1:58 PM

## 2023-10-04 LAB
PATH REPORT.COMMENTS IMP SPEC: NORMAL
PATH REPORT.COMMENTS IMP SPEC: NORMAL
PATH REPORT.FINAL DX SPEC: NORMAL
PATH REPORT.GROSS SPEC: NORMAL
PATH REPORT.MICROSCOPIC SPEC OTHER STN: NORMAL
PATH REPORT.MICROSCOPIC SPEC OTHER STN: NORMAL
PATH REPORT.RELEVANT HX SPEC: NORMAL
PHOTO IMAGE: NORMAL

## 2023-10-13 ENCOUNTER — TELEPHONE (OUTPATIENT)
Dept: GASTROENTEROLOGY | Facility: CLINIC | Age: 88
End: 2023-10-13
Payer: COMMERCIAL

## 2023-10-13 DIAGNOSIS — A04.8 HELICOBACTER PYLORI GASTROINTESTINAL TRACT INFECTION: Primary | ICD-10-CM

## 2023-10-13 RX ORDER — AMOXICILLIN 500 MG/1
1000 CAPSULE ORAL 2 TIMES DAILY
Qty: 56 CAPSULE | Refills: 0 | Status: SHIPPED | OUTPATIENT
Start: 2023-10-13 | End: 2023-10-13

## 2023-10-13 RX ORDER — AMOXICILLIN 500 MG/1
1000 CAPSULE ORAL 2 TIMES DAILY
Qty: 56 CAPSULE | Refills: 0 | Status: SHIPPED | OUTPATIENT
Start: 2023-10-13

## 2023-10-13 RX ORDER — CLARITHROMYCIN 500 MG
500 TABLET ORAL 2 TIMES DAILY
Qty: 28 TABLET | Refills: 0 | Status: SHIPPED | OUTPATIENT
Start: 2023-10-13

## 2023-10-13 RX ORDER — CLARITHROMYCIN 500 MG
500 TABLET ORAL 2 TIMES DAILY
Qty: 28 TABLET | Refills: 0 | Status: SHIPPED | OUTPATIENT
Start: 2023-10-13 | End: 2023-10-13

## 2023-10-13 NOTE — TELEPHONE ENCOUNTER
Patient agreeable to have treatment for H-Pylori infection.      Scripts will be sent to [facility].     Will follow up with patient in 2 weeks to make sure he received medications and has started treatment.     Please route message back to  [facility] pool so scripts can be faxed.     Amaris Merrill RN

## 2023-10-13 NOTE — TELEPHONE ENCOUNTER
Patient was not seen this week for virtual visit with Linda Lopez.  Patient has not answered phone when attempting to contact.      Call placed to day, message left to return call when able.     Patient needs to know about H-Pylori results from EGD and treatment options.     If patient agrees to treatment, scripts will need to be sent to [facility] with [facility] at [phone]    Amaris Merrill Rn

## 2023-10-27 ENCOUNTER — TELEPHONE (OUTPATIENT)
Dept: GASTROENTEROLOGY | Facility: CLINIC | Age: 88
End: 2023-10-27
Payer: COMMERCIAL

## 2023-10-27 NOTE — TELEPHONE ENCOUNTER
Call placed, message left to return call when able.     Need to verify that [facility] H.Pylori [facility] - Amoxicillin, Clarithromycin, and Omeprazole.     When did he start treatment?  How is he feeling since starting treatment?    Amaris Merrill Rn

## 2023-10-31 NOTE — TELEPHONE ENCOUNTER
Call placed, message left to return call when able.   Need information below.     Amaris Merrill RN

## 2023-11-17 NOTE — TELEPHONE ENCOUNTER
Called number provided by [facility], person who answered was patient's son Thanh.  Patient has been admitted to [facility] for seizures, he will be discharging next week to a Assisted Living Facility in [place].     Amaris Merrill Rn

## 2023-11-17 NOTE — TELEPHONE ENCOUNTER
Call placed to both home and cell phone, no answer messages left at both.  Spoke with [facility] to see if patient has had recent appointment with them or has different contact info.  [facility], but has no showed appointments since that time.  Number of [phone], this number was contacted, no answer with a unidentified voice mail.  [facility] also gave [facility] of [facility], facility was contacted and they do not have a patient by this name at facility.    Will continue to try number provided by [facility].      Amaris Merrill RN

## 2024-09-17 ENCOUNTER — LAB REQUISITION (OUTPATIENT)
Dept: LAB | Facility: CLINIC | Age: 89
End: 2024-09-17
Payer: COMMERCIAL

## 2024-09-17 DIAGNOSIS — I10 ESSENTIAL (PRIMARY) HYPERTENSION: ICD-10-CM

## 2024-09-17 DIAGNOSIS — R79.9 ABNORMAL FINDING OF BLOOD CHEMISTRY, UNSPECIFIED: ICD-10-CM

## 2024-09-17 DIAGNOSIS — Z13.228 ENCOUNTER FOR SCREENING FOR OTHER METABOLIC DISORDERS: ICD-10-CM

## 2024-09-17 DIAGNOSIS — Z79.899 OTHER LONG TERM (CURRENT) DRUG THERAPY: ICD-10-CM

## 2024-09-17 DIAGNOSIS — Z51.81 ENCOUNTER FOR THERAPEUTIC DRUG LEVEL MONITORING: ICD-10-CM

## 2024-09-18 LAB
ANION GAP SERPL CALCULATED.3IONS-SCNC: 10 MMOL/L (ref 7–15)
BUN SERPL-MCNC: 25.2 MG/DL (ref 8–23)
CALCIUM SERPL-MCNC: 9.3 MG/DL (ref 8.8–10.4)
CHLORIDE SERPL-SCNC: 108 MMOL/L (ref 98–107)
CREAT SERPL-MCNC: 1.06 MG/DL (ref 0.67–1.17)
EGFRCR SERPLBLD CKD-EPI 2021: 67 ML/MIN/1.73M2
ERYTHROCYTE [DISTWIDTH] IN BLOOD BY AUTOMATED COUNT: 13.7 % (ref 10–15)
GLUCOSE SERPL-MCNC: 84 MG/DL (ref 70–99)
HCO3 SERPL-SCNC: 23 MMOL/L (ref 22–29)
HCT VFR BLD AUTO: 42 % (ref 40–53)
HGB BLD-MCNC: 13.6 G/DL (ref 13.3–17.7)
LEVETIRACETAM SERPL-MCNC: 45.5 ΜG/ML (ref 10–40)
MCH RBC QN AUTO: 36.6 PG (ref 26.5–33)
MCHC RBC AUTO-ENTMCNC: 32.4 G/DL (ref 31.5–36.5)
MCV RBC AUTO: 113 FL (ref 78–100)
PLATELET # BLD AUTO: 173 10E3/UL (ref 150–450)
POTASSIUM SERPL-SCNC: 4.2 MMOL/L (ref 3.4–5.3)
RBC # BLD AUTO: 3.72 10E6/UL (ref 4.4–5.9)
SODIUM SERPL-SCNC: 141 MMOL/L (ref 135–145)
WBC # BLD AUTO: 6.5 10E3/UL (ref 4–11)

## 2024-09-18 PROCEDURE — 80177 DRUG SCRN QUAN LEVETIRACETAM: CPT | Mod: ORL | Performed by: NURSE PRACTITIONER

## 2024-09-18 PROCEDURE — 85027 COMPLETE CBC AUTOMATED: CPT | Mod: ORL | Performed by: NURSE PRACTITIONER

## 2024-09-18 PROCEDURE — P9604 ONE-WAY ALLOW PRORATED TRIP: HCPCS | Mod: ORL | Performed by: NURSE PRACTITIONER

## 2024-09-18 PROCEDURE — 80048 BASIC METABOLIC PNL TOTAL CA: CPT | Mod: ORL | Performed by: NURSE PRACTITIONER

## 2024-09-18 PROCEDURE — 36415 COLL VENOUS BLD VENIPUNCTURE: CPT | Mod: ORL | Performed by: NURSE PRACTITIONER

## 2025-07-17 ENCOUNTER — LAB REQUISITION (OUTPATIENT)
Dept: LAB | Facility: CLINIC | Age: OVER 89
End: 2025-07-17
Payer: COMMERCIAL

## 2025-07-17 DIAGNOSIS — Z01.818 ENCOUNTER FOR OTHER PREPROCEDURAL EXAMINATION: ICD-10-CM

## 2025-07-18 LAB
ANION GAP SERPL CALCULATED.3IONS-SCNC: 10 MMOL/L (ref 7–15)
BASOPHILS # BLD AUTO: 0.1 10E3/UL (ref 0–0.2)
BASOPHILS NFR BLD AUTO: 1 %
BUN SERPL-MCNC: 26.5 MG/DL (ref 8–23)
CALCIUM SERPL-MCNC: 9.5 MG/DL (ref 8.8–10.4)
CHLORIDE SERPL-SCNC: 109 MMOL/L (ref 98–107)
CREAT SERPL-MCNC: 1 MG/DL (ref 0.67–1.17)
EGFRCR SERPLBLD CKD-EPI 2021: 71 ML/MIN/1.73M2
EOSINOPHIL # BLD AUTO: 0.2 10E3/UL (ref 0–0.7)
EOSINOPHIL NFR BLD AUTO: 2 %
ERYTHROCYTE [DISTWIDTH] IN BLOOD BY AUTOMATED COUNT: 12 % (ref 10–15)
GLUCOSE SERPL-MCNC: 95 MG/DL (ref 70–99)
HCO3 SERPL-SCNC: 22 MMOL/L (ref 22–29)
HCT VFR BLD AUTO: 43.9 % (ref 40–53)
HGB BLD-MCNC: 13.8 G/DL (ref 13.3–17.7)
IMM GRANULOCYTES # BLD: 0 10E3/UL
IMM GRANULOCYTES NFR BLD: 0 %
LYMPHOCYTES # BLD AUTO: 1.5 10E3/UL (ref 0.8–5.3)
LYMPHOCYTES NFR BLD AUTO: 18 %
MCH RBC QN AUTO: 34.8 PG (ref 26.5–33)
MCHC RBC AUTO-ENTMCNC: 31.4 G/DL (ref 31.5–36.5)
MCV RBC AUTO: 111 FL (ref 78–100)
MONOCYTES # BLD AUTO: 0.7 10E3/UL (ref 0–1.3)
MONOCYTES NFR BLD AUTO: 9 %
NEUTROPHILS # BLD AUTO: 5.7 10E3/UL (ref 1.6–8.3)
NEUTROPHILS NFR BLD AUTO: 70 %
NRBC # BLD AUTO: 0 10E3/UL
NRBC BLD AUTO-RTO: 0 /100
PLATELET # BLD AUTO: 236 10E3/UL (ref 150–450)
POTASSIUM SERPL-SCNC: 4.3 MMOL/L (ref 3.4–5.3)
RBC # BLD AUTO: 3.97 10E6/UL (ref 4.4–5.9)
SODIUM SERPL-SCNC: 141 MMOL/L (ref 135–145)
WBC # BLD AUTO: 8.1 10E3/UL (ref 4–11)

## 2025-07-18 PROCEDURE — 85025 COMPLETE CBC W/AUTO DIFF WBC: CPT | Mod: ORL | Performed by: NURSE PRACTITIONER

## 2025-07-18 PROCEDURE — P9603 ONE-WAY ALLOW PRORATED MILES: HCPCS | Mod: ORL | Performed by: NURSE PRACTITIONER

## 2025-07-18 PROCEDURE — 36415 COLL VENOUS BLD VENIPUNCTURE: CPT | Mod: ORL | Performed by: NURSE PRACTITIONER

## 2025-07-18 PROCEDURE — 80048 BASIC METABOLIC PNL TOTAL CA: CPT | Mod: ORL | Performed by: NURSE PRACTITIONER

## (undated) DEVICE — ENDO FORCEP ENDOJAW BIOPSY 2.8MMX230CM FB-220U

## (undated) DEVICE — SOL WATER IRRIG 1000ML BOTTLE 2F7114

## (undated) DEVICE — KIT ENDO TURNOVER/PROCEDURE CARRY-ON 101822

## (undated) DEVICE — SNARE CAPIVATOR ROUND COLD SNR BX10 M00561101

## (undated) DEVICE — TUBING SUCTION 12"X1/4" N612

## (undated) DEVICE — LUBRICATING JELLY 4.25OZ

## (undated) DEVICE — TUBING SUCTION 6"X3/16" N56A